# Patient Record
(demographics unavailable — no encounter records)

---

## 2025-07-22 NOTE — PHYSICAL EXAM
[General Appearance - Alert] : alert [General Appearance - In No Acute Distress] : in no acute distress [Fluency] : fluency intact [Comprehension] : comprehension intact [Cranial Nerves Optic (II)] : visual acuity intact bilaterally,  pupils equal round and reactive to light [Cranial Nerves Oculomotor (III)] : extraocular motion intact [Cranial Nerves Trigeminal (V)] : facial sensation intact symmetrically [Cranial Nerves Facial (VII)] : face symmetrical [Cranial Nerves Vestibulocochlear (VIII)] : hearing was intact bilaterally [Cranial Nerves Glossopharyngeal (IX)] : tongue and palate midline [Cranial Nerves Accessory (XI - Cranial And Spinal)] : head turning and shoulder shrug symmetric [Cranial Nerves Hypoglossal (XII)] : there was no tongue deviation with protrusion [Motor Strength] : muscle strength was normal in all four extremities [Sensation Tactile Decrease] : light touch was intact [Abnormal Walk] : normal gait [1+] : Ankle jerk left 1+ [Normal] : normal [Able to toe walk] : the patient was able to toe walk [Able to heel walk] : the patient was able to heel walk

## 2025-07-22 NOTE — END OF VISIT
Needs 90 day supply.   Current Outpatient Prescriptions   Medication Sig   • valsartan (DIOVAN) 320 MG tablet TAKE ONE TABLET BY MOUTH EVERY DAY   • glimepiride (AMARYL) 4 MG tablet Take 1 tablet by mouth daily.   • hydrochlorothiazide (HYDRODIURIL) 25 MG tablet TAKE ONE TABLET BY MOUTH EVERY OTHER DAY   • insulin glargine (BASAGLAR KWIKPEN) 100 UNIT/ML pen-injector Inject 18 Units into the skin nightly.   • calcium carbonate-vitamin D (CALTRATE 600+D) 600-400 MG-UNIT per tablet Take 0.5 tablets by mouth daily.   • tolnaftate (TINACTIN) 1 % cream Apply 1 application topically daily as needed (to affected areas).   • sitaGLIPtin-metFORMIN (JANUMET XR)  MG TABLET SR 24 HR Take 1 tablet by mouth Twice daily after meals. Indications: Type 2 Diabetes   • Insulin Pen Needle (B-D U/F PEN NEEDLE) 31G X 5 MM Misc Use new needle with every injection, once daily   • atorvastatin (LIPITOR) 10 MG tablet Take 1 tablet by mouth daily.   • Magnesium 250 MG Tab Take 1 tablet by mouth daily.   • naproxen sodium (ALEVE) 220 MG tablet Take 220 mg by mouth 2 times daily (with meals).   • estradiol (ESTRACE) 0.1 MG/GM vaginal cream Place 1 g vaginally 2 times daily. INSERT 1 GRAM VAGINALLY AT BEDTIME 2 TIMES PER WEEK   • Glucose Blood (ONE TOUCH ULTRA TEST) strip Test blood sugars once daily while fasting   • B-D ULTRA-FINE 33 LANCETS MISC Use new lancet with every testing, once daily  Indications: Diabetes   • B Complex Vitamins (VITAMIN B COMPLEX) tablet Take 1 tablet by mouth daily.     No current facility-administered medications for this visit.        
[Time Spent: ___ minutes] : I have spent [unfilled] minutes of time on the encounter which excludes teaching and separately reported services.

## 2025-07-22 NOTE — HISTORY OF PRESENT ILLNESS
[de-identified] : AFSHAN LIANG is a right handed 49 year old female with a PMH of DM (on Metformin) who presents to the office today for neurosurgical consultation due to cervical radiculopathy.  The pain is characterized as aching, sharp, tingling in nature. She saw her PCP who referred her to orthopedics and PT with medication was ordered as well as x-rays performed.  It started around COVID 2020. It is exacerbated by movement and relieved by no factors.  It radiates from neck across upper back into left arm (mostly upper arm) down into her left hand. She will have headaches from the muscle spasms. There has been no known trauma precipitating the pain.  The patient denies numbness of extremities, weakness of extremities, bowel or bladder incontinence and gait disturbance.  She has tried PT (going to start), and pain medications including Medrol dose pack, Meloxicam, Motrin, heat, massage in the last month without relief.  She has undergone imaging in the form of xrays cervical spine (not available for review).    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

## 2025-07-22 NOTE — ASSESSMENT
[FreeTextEntry1] : AFSHAN LIANG is a right handed 49 year old female with a PMH of DM (on Metformin) who presents to the office today for neurosurgical consultation due to cervical radiculopathy. The pain is impacting her ADLs limiting her from working. She has tried conservative measures as far as medications and heat, massages with no relief.   I have discussed the natural history and treatment options for cervical radiculopathy with the patient. I explained the indications for observation, conservative management, medical management, physical therapy, pain management approaches and surgery.  In the end, I recommend additional imaging in the form of MRI cervical spine to better assess the extent of central canal as well as foraminal stenosis and disc disease. As well as cervical spine XR flexion, extension, AP/lateral to better assess lumbar spinal alignment and to r/o dynamic instability. I have referred her to pain management and she should continue PT in the interim. I advised her to continue heat and trial a TENS unit. I have provided her paperwork to extend her leave until 8/12 so she can continue physical therapy and strength training. She will follow up after imaging is complete with Dr. Luque for further treatment planning.